# Patient Record
Sex: MALE | Race: WHITE
[De-identification: names, ages, dates, MRNs, and addresses within clinical notes are randomized per-mention and may not be internally consistent; named-entity substitution may affect disease eponyms.]

---

## 2017-01-02 ENCOUNTER — HOSPITAL ENCOUNTER (EMERGENCY)
Dept: HOSPITAL 10 - E/R | Age: 32
Discharge: HOME | End: 2017-01-02
Payer: COMMERCIAL

## 2017-01-02 VITALS — BODY MASS INDEX: 53.67 KG/M2 | HEIGHT: 60 IN | WEIGHT: 273.37 LBS

## 2017-01-02 VITALS
RESPIRATION RATE: 19 BRPM | HEART RATE: 88 BPM | DIASTOLIC BLOOD PRESSURE: 78 MMHG | SYSTOLIC BLOOD PRESSURE: 123 MMHG | TEMPERATURE: 98.7 F

## 2017-01-02 DIAGNOSIS — S09.90XA: ICD-10-CM
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
## 2017-01-02 DIAGNOSIS — R55: ICD-10-CM

## 2017-01-02 DIAGNOSIS — F17.210: ICD-10-CM

## 2017-01-02 DIAGNOSIS — R50.9: Primary | ICD-10-CM
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
## 2017-01-02 LAB
ANION GAP SERPL CALC-SCNC: 17 MMOL/L (ref 8–16)
BASOPHILS # BLD AUTO: 0 10^3/UL (ref 0–0.1)
BASOPHILS NFR BLD: 0.5 % (ref 0–2)
BUN SERPL-MCNC: 11 MG/DL (ref 7–20)
CALCIUM SERPL-MCNC: 9 MG/DL (ref 8.4–10.2)
CHLORIDE SERPL-SCNC: 102 MMOL/L (ref 97–110)
CO2 SERPL-SCNC: 24 MMOL/L (ref 21–31)
CONDITION: 1
CREAT SERPL-MCNC: 0.87 MG/DL (ref 0.61–1.24)
D DIMER PPP FEU-MCNC: < 220 NG/ML (ref ?–460)
EOSINOPHIL # BLD: 0.3 10^3/UL (ref 0–0.5)
EOSINOPHIL NFR BLD: 4.4 % (ref 0–7)
ERYTHROCYTE [DISTWIDTH] IN BLOOD BY AUTOMATED COUNT: 13.2 % (ref 11.5–14.5)
GLUCOSE SERPL-MCNC: 111 MG/DL (ref 70–220)
HCT VFR BLD CALC: 49.6 % (ref 42–52)
HGB BLD-MCNC: 16.9 G/DL (ref 14–18)
LYMPHOCYTES # BLD AUTO: 0.9 10^3/UL (ref 0.8–2.9)
LYMPHOCYTES NFR BLD AUTO: 15.9 % (ref 15–51)
MCH RBC QN AUTO: 32 PG (ref 29–33)
MCHC RBC AUTO-ENTMCNC: 34.2 G/DL (ref 32–37)
MCV RBC AUTO: 93.6 FL (ref 82–101)
MONOCYTES # BLD: 0.9 10^3/UL (ref 0.3–0.9)
MONOCYTES NFR BLD: 14.6 % (ref 0–11)
NEUTROPHILS # BLD: 3.8 10^3/UL (ref 1.6–7.5)
NEUTROPHILS NFR BLD AUTO: 64.6 % (ref 39–77)
NRBC # BLD MANUAL: 0 10^3/UL (ref 0–0)
NRBC BLD QL: 0 /100WBC (ref 0–0)
PLATELET # BLD: 154 10^3/UL (ref 140–440)
PMV BLD AUTO: 9.1 FL (ref 7.4–10.4)
POTASSIUM SERPL-SCNC: 3.4 MMOL/L (ref 3.5–5.1)
RBC # BLD AUTO: 5.3 10^6/UL (ref 4.7–6.1)
SODIUM SERPL-SCNC: 140 MMOL/L (ref 135–144)
WBC # BLD AUTO: 5.9 10^3/UL (ref 4.8–10.8)
WBC # BLD: 5.9 10^3/UL (ref 4.8–10.8)

## 2017-01-02 PROCEDURE — 71010: CPT

## 2017-01-02 PROCEDURE — 85025 COMPLETE CBC W/AUTO DIFF WBC: CPT

## 2017-01-02 PROCEDURE — 82962 GLUCOSE BLOOD TEST: CPT

## 2017-01-02 PROCEDURE — 36415 COLL VENOUS BLD VENIPUNCTURE: CPT

## 2017-01-02 PROCEDURE — 70450 CT HEAD/BRAIN W/O DYE: CPT

## 2017-01-02 PROCEDURE — 93005 ELECTROCARDIOGRAM TRACING: CPT

## 2017-01-02 PROCEDURE — 85378 FIBRIN DEGRADE SEMIQUANT: CPT

## 2017-01-02 PROCEDURE — 96374 THER/PROPH/DIAG INJ IV PUSH: CPT

## 2017-01-02 PROCEDURE — 80048 BASIC METABOLIC PNL TOTAL CA: CPT

## 2017-01-02 NOTE — RADRPT
PROCEDURE:   XR Chest. 

 

CLINICAL INDICATION:   Syncope

 

TECHNIQUE:   AP view of the chest was obtained. 

 

COMPARISON:   08/05/2016 

 

FINDINGS:

The cardiomediastinal silhouette is within normal limits.  The lungs are clear.  No pleural effusion
 or pneumothorax is evident.  

 

 

IMPRESSION:

No evidence of active cardiopulmonary disease.

 

RPTAT: GG

_____________________________________________ 

.Julio C Soria MD, MD           Date    Time 

Electronically viewed and signed by .Julio C Soria MD, MD on 01/02/2017 10:57 

 

D:  01/02/2017 10:57  T:  01/02/2017 10:57

.O/

## 2017-01-02 NOTE — RADRPT
PROCEDURE:   CT Brain without. 

 

CLINICAL INDICATION:   Syncope

 

TECHNIQUE:   A CT of the brain was performed utilizing axial sections from the skull base through th
e vertex without contrast.  The scan was reviewed in soft tissue brain and high frequency resolution
 bone algorithm windows.  Images were reviewed on a high-resolution PACS workstation. The exam CTDI 
= 43.95 mGy, and the DLP = 720.23 mGy-cm. 

 

COMPARISON:   None available  

 

FINDINGS:

 

The ventricles are normal in size and midline in position.  There is no intracranial hemorrhage, mid
line shift, or mass effect.  No abnormal extra-axial fluid collections are identified.  There is a 1
.8 x 3.1 cm cyst within the left anterior middle cranial fossa. The gray-white differentiation is we
ll preserved.  The basal cisterns are patent.  The posterior fossa is unremarkable.

 

The visualized portions of the orbits are unremarkable.  There is mild mucosal thickening of the max
illary sinuses.  The paranasal sinuses and mastoid air cells are otherwise clear.  No calvarial frac
ture or abnormality are identified.  The soft tissues are unremarkable.

 

IMPRESSION:

 

1.  No acute intracranial abnormality identified.

2.  1.8 x 3.1 cm arachnoid cyst within the left middle cranial fossa.

3.  Mild mucosal thickening of the maxillary sinuses.

 

 

RPTAT: HH

_____________________________________________ 

.Kelly Ramirez MD, MD           Date    Time 

Electronically viewed and signed by .Kelly Ramirez MD, MD on 01/02/2017 11:06 

 

D:  01/02/2017 11:06  T:  01/02/2017 11:06

.G/

## 2017-01-02 NOTE — ERD
ER Documentation


Chief Complaint


Date/Time


DATE: 1/2/17 


TIME: 11:46


Chief Complaint


BODYACHES, FEVER, HEADACHE, COUGH, ONSET 3 DAYS





HPI


31-year-old male no significant past medical history who presents to the 

emergency room with flulike symptoms.  Unfortunately, the patient had a 

syncopal episode in triage and hit his head.  The patient describes several 

days of flulike symptoms that include fever, myalgias, runny nose, cough and 

congestion.  He states the cough is dry and nonproductive.  He denies any rash, 

headache or neck stiffness.  While the patient was checking and he started to 

feel lightheaded his vision closed in any fall to the ground hitting his head.  

He had no witnessed seizure activity, Accu-Chek was normal.  The patient is now 

describing mild dull achy throbbing headache.





ROS


All systems reviewed and are negative except as per history of present illness.





Medications


Home Meds


Active Scripts


Ibuprofen* (Motrin*) 800 Mg Tab, 800 MG PO Q6H Y for PAIN AND OR ELEVATED TEMP, 

#30 TAB


   Prov:CECILIA BOWENS MD         1/2/17


Discontinued Scripts


Methylprednisolone* (Medrol* DOSE PACK) 4 Mg/Dose-Pack Tab.ds.pk, 4 MG PO .AS 

DIRECTED, #1 PACKET


   Prov:FÁTIMA ROJAS PA-C         10/28/16


Naproxen* (Naprosyn*) 500 Mg Tablet, 500 MG PO BID Y for PAIN AND/OR 

INFLAMMATION, #30 TAB


   Prov:FÁTIMA ROJAS PA-C         10/28/16


Naproxen* (Naprosyn*) 500 Mg Tablet, 500 MG PO BID Y for PAIN AND/OR 

INFLAMMATION, #15 TAB


   Prov:ELSIE FAITH DO         8/5/16





Allergies


Allergies:  


Coded Allergies:  


     No Known Allergy (Unverified , 3/22/13)





PMhx/Soc


History of Surgery:  Yes (APPendectomy)


Anesthesia Reaction:  No


Hx Neurological Disorder:  No


Hx Respiratory Disorders:  No


Hx Cardiac Disorders:  No


Hx Psychiatric Problems:  No


Hx Miscellaneous Medical Probl:  No


Hx Alcohol Use:  Yes (last wednesday)


Hx Substance Use:  No


Hx Tobacco Use:  Yes (1 pack every 3 weeks)


Smoking Status:  Current every day smoker





FmHx


Family History:  No diabetes





Physical Exam


Vitals





Vital Signs








  Date Time  Temp Pulse Resp B/P Pulse Ox O2 Delivery O2 Flow Rate FiO2


 


1/2/17 11:50  95 15 132/85 97 Nasal Cannula 3.0 


 


1/2/17 09:51 101.4 102 18 162/93 98   








Physical Exam


Airway is intact


Bilateral breath sounds


Strong distal pulses


No obvious deficits





General: Well developed, well nourished, no acute distress, feels warm to touch


Head: Normocephalic, atraumatic


Eyes: Pupils equally reactive, EOM intact


ENT: Moist mucous membranes


Neck: Supple, no lymphadenopathy, No midline tenderness, deformities, step-offs 

to the cervical spine, full active and passive range of motion without midline 

pain.


Respiratory: Lungs clear bilaterally, no distress, no chest wall tenderness, no 

crepitus


Cardiovascular: RRR, no murmurs, rubs, or gallops


Abdominal: Soft, non-tender, non-distended, no peritoneal signs, pelvis is 

stable


: Deferred


MSK: No edema, no unilateral swelling, 5/5 strength, no midline tenderness 

deformities or step-offs to the thoracolumbar spine


Neurologic: Alert and oriented, moving all extremities, normal speech, no focal 

weakness, no cerebellar signs, no meningismus


Skin: No ecchymoses or bruising to the chest or abdomen


Psych: Normal mood


Result Diagram:  


1/2/17 1111                                                                    

            1/2/17 1111





Results 24 hrs





 Laboratory Tests








Test


  1/2/17


11:11 1/2/17


11:28


 


Anion Gap 17  


 


Basophils # 0.010^3/ul  


 


Basophils % 0.5%  


 


Blood Urea Nitrogen 11mg/dl  


 


Calcium Level 9.0mg/dl  


 


Carbon Dioxide Level 24mmol/L  


 


Chloride Level 102mmol/L  


 


Creatinine 0.87mg/dl  


 


Eosinophils # 0.310^3/ul  


 


Eosinophils % 4.4%  


 


Glucose Level 111mg/dl  


 


Hematocrit 49.6%  


 


Hemoglobin 16.9g/dl  


 


Lymphocytes # 0.910^3/ul  


 


Lymphocytes % 15.9%  


 


Mean Corpuscular Hemoglobin 32.0pg  


 


Mean Corpuscular Hemoglobin


Concent 34.2g/dl 


  


 


 


Mean Corpuscular Volume 93.6fl  


 


Mean Platelet Volume 9.1fl  


 


Monocytes # 0.910^3/ul  


 


Monocytes % 14.6%  


 


Neutrophils # 3.810^3/ul  


 


Neutrophils % 64.6%  


 


Nucleated Red Blood Cells # 0.010^3/ul  


 


Nucleated Red Blood Cells % 0.0/100WBC  


 


Platelet Count 49133^3/UL  


 


Potassium Level 3.4mmol/L  


 


Red Blood Count 5.3010^6/ul  


 


Red Cell Distribution Width 13.2%  


 


Sodium Level 140mmol/L  


 


White Blood Count 5.910^3/ul  


 


Bedside Glucose  108mg/dL 








 Current Medications








 Medications


  (Trade)  Dose


 Ordered  Sig/Ramandeep


 Route


 PRN Reason  Start Time


 Stop Time Status Last Admin


Dose Admin


 


 Sodium Chloride


  (NS)  1,000 ml @ 


 1,000 mls/hr  Q1H STAT


 IV


   1/2/17 10:39


 1/2/17 11:38 DC 1/2/17 11:23


 


 


 Ketorolac


 Tromethamine


  (Toradol)  15 mg  ONCE  STAT


 IV


   1/2/17 11:33


 1/2/17 11:34 DC 1/2/17 11:42


 


 


 Acetaminophen


  (Tylenol Tab)  1,000 mg  ONCE  STAT


 PO


   1/2/17 11:33


 1/2/17 11:34 DC 1/2/17 11:42


 











Procedures/MDM


EKG, MONITORS, & DIAGNOSTIC IMAGING:


EKG: I reviewed and interpreted a 12-lead EKG. 


Rhythm: Normal sinus rhythm


Ectopy: None


Intervals: No abnormalities


ST segments: No elevations or depressions


T waves: No contiguous inversions








Chest x-ray: I reviewed and interpreted a 1 view of the chest


Mediastinum: No enlargement


Cardiac silhouette: No cardiomegaly


Airspace: Clear lung fields bilaterally without evidence of pneumothorax


Bones: No evidence of fracture








CT brain: No acute process








LAB INTERPRETATION:


No leukocytosis, no anemia





MEDICAL DECISION MAKING:


The patient presents with flulike symptoms.  The patient's clinical 

presentation is very consistent with an acute viral syndrome.





The patient does not exhibit any clinical signs or symptoms concerning for 

serious bacterial infection or systemic illness. Based on history and clinical 

exam findings the patient does not appear to have evidence of pneumonia, strep 

pharyngitis, urinary tract infection, bacteremia, sepsis, or meningitis.





The patient also had a syncopal episode in triage that is very consistent with 

vasovagal syncope.  He did hit his head, no occipital hematoma.  CT imaging is 

appropriate given head trauma.  The patient does not meet high-risk criteria 

and based on NEXUS cervical spine criteria there is no indication for cervical 

spine imaging at this time.





The patient's syncopal episode is not consistent with more serious etiology 

such as subarachnoid hemorrhage, dissection, meningitis.  His headache occurred 

after the head trauma and is not consistent with meningitis or encephalitis.





ER COURSE:


The patient was evaluated he was given IV fluids, antipyretics with improved 

symptomatology.  His laboratory testing and diagnostic imaging shows no 

evidence of endorgan dysfunction or serious etiology or injury secondary to 

fall.  The patient was advised of symptom control including oral hydration, 

antipyretics.  At this time no indication for antibiotics.  The patient does 

not meet criteria for Tamiflu.





I kept the patient and/or family informed of laboratory and diagnostic imaging 

results throughout the emergency room course.





DISPOSITION PLAN:


We discussed follow up with the patient's primary care doctor within 24 to 48 

hours as needed.  We also discussed return to the emergency room for worsening 

symptoms or worsening condition.





Discharge Medications:


Motrin





Departure


Diagnosis:  


 Primary Impression:  


 Influenza-like illness


 Additional Impressions:  


 Vasovagal syncope


 Closed head injury


 Encounter type:  initial encounter  Qualified Code:  S09.90XA - Closed head 

injury, initial encounter


Condition:  Stable











CECILIA BOWENS MD Jan 2, 2017 11:49

## 2017-01-14 ENCOUNTER — HOSPITAL ENCOUNTER (EMERGENCY)
Dept: HOSPITAL 10 - FTE | Age: 32
Discharge: HOME | End: 2017-01-14
Payer: COMMERCIAL

## 2017-01-14 VITALS
BODY MASS INDEX: 42.61 KG/M2 | HEIGHT: 70 IN | HEIGHT: 70 IN | WEIGHT: 297.62 LBS | WEIGHT: 297.62 LBS | BODY MASS INDEX: 42.61 KG/M2

## 2017-01-14 VITALS
TEMPERATURE: 98.7 F | RESPIRATION RATE: 18 BRPM | DIASTOLIC BLOOD PRESSURE: 78 MMHG | SYSTOLIC BLOOD PRESSURE: 128 MMHG | HEART RATE: 69 BPM

## 2017-01-14 DIAGNOSIS — R00.2: Primary | ICD-10-CM

## 2017-01-14 DIAGNOSIS — F17.210: ICD-10-CM

## 2017-01-14 PROCEDURE — 93005 ELECTROCARDIOGRAM TRACING: CPT

## 2017-01-14 PROCEDURE — 99283 EMERGENCY DEPT VISIT LOW MDM: CPT

## 2017-01-14 NOTE — ERD
ER Documentation


Chief Complaint


Date/Time


DATE: 1/14/17 


TIME: 23:04


Chief Complaint


c/p pain, palpitations prior to arrival





HPI


This is a 31-year-old male who presents to the emergency department today 

complaining of palpitations that started yesterday at work.  Patient states it 

stopped and then started back up again today at work.  He states that he felt 

like he got pale and felt like he had numbness and that there was fluid moving 

through his body.  Denies any cough, fevers or chills.





ROS


All systems reviewed and are negative except as per history of present illness.





Medications


Home Meds


Active Scripts


Ibuprofen* (Motrin*) 800 Mg Tab, 800 MG PO Q6H Y for PAIN AND OR ELEVATED TEMP, 

#30 TAB


   Prov:CECILIA BOWENS MD         1/2/17





Allergies


Allergies:  


Coded Allergies:  


     No Known Allergy (Unverified , 3/22/13)





PMhx/Soc


Medical and Surgical Hx:  pt denies Medical Hx


History of Surgery:  Yes (APPendectomy)


Anesthesia Reaction:  No


Hx Neurological Disorder:  No


Hx Respiratory Disorders:  No


Hx Cardiac Disorders:  No


Hx Psychiatric Problems:  No


Hx Miscellaneous Medical Probl:  No


Hx Alcohol Use:  Yes (last wednesday)


Hx Substance Use:  Yes (COCAINE 1 TIME)


Hx Tobacco Use:  Yes (1 pack every 3 weeks)


Smoking Status:  Current some day smoker





Physical Exam


Vitals





Vital Signs








  Date Time  Temp Pulse Resp B/P Pulse Ox O2 Delivery O2 Flow Rate FiO2


 


1/14/17 19:11 98.6 90 18 145/88 95   








Physical Exam


Const:     Talkative, no acute distress


Head:   Atraumatic 


Eyes:    Normal Conjunctiva


ENT:    Normal External Ears, Nose and Mouth.


Neck:               Full range of motion..~ No meningismus.


Resp:    Clear to auscultation bilaterally.  No absent breath sounds.  No 

wheezing.


Cardio:    Regular rate and rhythm, no murmurs


Abd:    Soft, non tender, non distended. Normal bowel sounds


Skin:    No petechiae or rashes


Back:    No midline or flank tenderness


Ext:    No cyanosis, or edema.  No leg pain.


Neur:    Awake and alert


Psych:    Normal Mood and Affect





Procedures/MDM


This is a 31-year-old male who presents to the emergency department today 

complaining of palpitations.





EKG read and interpreted by Dr. Rosas rate 82 bpm.  No ST elevation.  No QT 

prolongation.  Normal sinus rhythm with incomplete right bundle branch block.  

Patient had no syncopal episode.  Low suspicion for acute MI, PE, pericarditis.





I did offer to obtain a chest x-ray for the patient however he states he was 

here a couple of weeks ago for a fever and had a chest x-ray at that time that 

was negative.  Patient declined a secondary chest x-ray.  Patient denied 

feeling anxiety however his symptoms at this time appear most consistent with 

anxiety.  I do not feel the patient requires further imaging or laboratory 

workup.  Patient declined antianxiety meds here in the emergency department.  

He declined medication for home.  I did discuss with the patient that he needed 

to follow-up and establish with a primary care physician as he does have 

insurance through his school.  I have also given him a list of resources for 

cardiology as he appears to have these palpitations frequently.  I explained to 

the patient that he could follow-up with them for a Holter monitor.  This time 

patient is afebrile and otherwise well-appearing.  He is very talkative in the 

exam room.  Low suspicion for PE.  He has no cough.





At this time the patient is stable for discharge and outpatient management. 

Patient should follow up with their PCP in the next 1-2 days.  They may return 

to the emergency department sooner for any persistent or worsening of symptoms.

  Patient understood and agreed with the plan.





Departure


Diagnosis:  


 Primary Impression:  


 Palpitations


Condition:  Fair


Patient Instructions:  Your Body's Response to Anxiety, Palpitations


Referrals:  


DC DÍAZ,ASIA CAIN MD,RICKY COHEN,REINIER FUNEZ,ERINN WARD,VANIA FARIAS,RAMSEY HEBERT,CHRISTINE CARRILLO,DEREK TOLEDO,EDWIN VAUGHAN,CHINA PARMAR,MAIRA FRITZ MD








[facility]


YOU HAVE RECEIVED A MEDICAL SCREENING EXAM AND THE RESULTS INDICATE THAT YOU DO 

NOT HAVE A CONDITION THAT REQUIRES URGENT TREATMENT IN THE EMERGENCY DEPARTMENT.





FURTHER EVALUATION AND TREATMENT OF YOUR CONDITION CAN WAIT UNTIL YOU ARE SEEN 

IN YOUR DOCTORS OFFICE WITHIN THE NEXT 1-2 DAYS. IT IS YOUR RESPONSIBILITY TO 

MAKE AN APPOINTMENT FOR FOLOW-UP CARE.





IF YOU HAVE A PRIMARY DOCTOR


--you should call your primary doctor and schedule an appointment





IF YOU DO NOT HAVE A PRIMARY DOCTOR YOU CAN CALL OUR PHYSICIAN REFERRAL HOTLINE 

AT


 [phone] 





IF YOU CAN NOT AFFORD TO SEE A PHYSICIAN YOU CAN CHOSE FROM THE FOLLOWING 

[facility]





[facility] [phone] [address] [facility] [phone] [address] [facility] [place] [phone] [facility] [phone] [address] [facility] [phone] [address] [facility]. [facility]. [phone] [address]





Additional Instructions:  


Call your primary care doctor TOMORROW for an appointment during the next 1-2 

days.See the doctor sooner or return here if your condition worsens before your 

appointment time.





Make an appointment with a primary care physician for referral to cardiology.











DEVYN NASCIMENTO PA-C Jan 14, 2017 23:08

## 2017-05-27 ENCOUNTER — HOSPITAL ENCOUNTER (EMERGENCY)
Dept: HOSPITAL 10 - FTE | Age: 32
Discharge: HOME | End: 2017-05-27
Payer: COMMERCIAL

## 2017-05-27 VITALS
WEIGHT: 273.37 LBS | BODY MASS INDEX: 39.14 KG/M2 | WEIGHT: 273.37 LBS | BODY MASS INDEX: 39.14 KG/M2 | HEIGHT: 70 IN | HEIGHT: 70 IN

## 2017-05-27 DIAGNOSIS — M54.40: Primary | ICD-10-CM

## 2017-05-27 DIAGNOSIS — F17.210: ICD-10-CM

## 2017-05-27 DIAGNOSIS — M79.672: ICD-10-CM

## 2017-05-27 PROCEDURE — 73630 X-RAY EXAM OF FOOT: CPT

## 2017-05-27 PROCEDURE — 72100 X-RAY EXAM L-S SPINE 2/3 VWS: CPT

## 2017-05-27 NOTE — RADRPT
PROCEDURE:   LUMBAR SPINE - 3 VIEWS 

 

CLINICAL INDICATION:   31-year-old male with back pain. 

 

TECHNIQUE:   AP, lateral and cone-down lateral view of the lumbar spine were obtained. The images we
re reviewed on a PACS workstation. 

 

COMPARISON:   None. 

 

FINDINGS:

 

The lumbar vertebral bodies have normal heights.  There is evidence for L4 pars interarticularis def
ects.  There is anterolisthesis of L4 on L5 of approximately 30%.  There is moderate L4-5 disk space
 narrowing.  The partially visualized sacrum appears intact.  The sacroiliac joints are unremarkable
.

 

IMPRESSION:

 

L4-5 spondylolisthesis (30%) with discogenic disease.

_____________________________________________ 

.Deshawn Iraheta MD, MD           Date    Time 

Electronically viewed and signed by .Deshawn Iraheta MD, MD on 05/27/2017 06:01 

 

D:  05/27/2017 06:01  T:  05/27/2017 06:01

.NING

## 2017-05-27 NOTE — RADRPT
PROCEDURE:    LEFT FOOT - 3 VIEWS 

 

CLINICAL INDICATION:   31-year-old male with left foot pain. 

 

TECHNIQUE:   AP, lateral and oblique views of the left foot was obtained.  The images were reviewed 
on a PACS workstation. 

 

COMPARISON:   None. 

 

FINDINGS:

 

The bones of the left foot appear intact, with no evidence of fracture, dislocation, or subluxation.
 The joint spaces are preserved. Bone mineralization is within normal limits.  No radiopaque foreign
 body is seen.

 

IMPRESSION:

 

Unremarkable left foot radiographs.

_____________________________________________ 

.Deshawn Iraheta MD, MD           Date    Time 

Electronically viewed and signed by .Deshawn Iraheta MD, MD on 05/27/2017 06:03 

 

D:  05/27/2017 06:03  T:  05/27/2017 06:03

.M/

## 2017-05-27 NOTE — ERA
ER Documentation


Chief Complaint


Date/Time


DATE: 5/27/17 


TIME: 04:19


Chief Complaint


low back pain running down to left leg  since yesterday





HPI


This is a 31-year-old male who presents with left foot pain that radiates 

upwards to his back for the past 2 months.  Patient states that walking and 

standing and pressure of the affected area on the bottom of the left foot makes 

it worse.  Patient denies any relieving factors except for rest.  Patient has 

not taken any medication to relieve the symptoms.  Patient has been drinking 2 

hours ago.  Patient denies pain lasting more than 6 weeks; Denies saddle 

parasthesia, incontinence, RPND, or pain exacerbated by valsalva; Denies fever, 

chills, night sweats, weight loss, or increase symptoms at night. Patient also 

denies h/o diabetes, cardiovascular dz, sciatica, disk herniation, spinal 

stenosis, fibromyalgia, cancer, HIV, IVDU, arthritis or recent surgery.





ROS


All systems reviewed and are negative except as per history of present illness.





Medications


Home Meds


Active Scripts


Ibuprofen* (Motrin*) 800 Mg Tab, 800 MG PO Q6H Y for PAIN AND OR ELEVATED TEMP, 

#30 TAB


   Prov:CECILIA BOWENS MD         1/2/17





Allergies


Allergies:  


Coded Allergies:  


     No Known Allergy (Unverified , 3/22/13)





PMhx/Soc


History of Surgery:  Yes (APPendectomy)


Anesthesia Reaction:  No


Hx Neurological Disorder:  No


Hx Respiratory Disorders:  No


Hx Cardiac Disorders:  No


Hx Psychiatric Problems:  No


Hx Miscellaneous Medical Probl:  No


Hx Alcohol Use:  Yes (last wednesday)


Hx Substance Use:  Yes (COCAINE 1 TIME)


Hx Tobacco Use:  Yes (1 pack every 3 weeks)


Smoking Status:  Current every day smoker





Physical Exam


Vitals





Vital Signs








  Date Time  Temp Pulse Resp B/P Pulse Ox O2 Delivery O2 Flow Rate FiO2


 


5/27/17 03:59 97.8 120 20 143/90 100   








Physical Exam


Const: Morbidly obese 31-year-old male


Head:   Atraumatic 


Eyes:    Normal Conjunctiva


ENT:    Normal External Ears, Nose and Mouth.


Neck:               Full range of motion..~ No meningismus.


Resp:    Clear to auscultation bilaterally


Cardio:    Regular rate and rhythm, no murmurs


Abd:    Soft, non tender, non distended. Normal bowel sounds


Skin:    No petechiae or rashes


Back:    No midline or flank tenderness.  Negative straight leg raise.  No CVA 

tenderness.


Ext:    No cyanosis, or edema.  Normal movement of all extremities with gross 

examination.


Neur:    Awake and alert.  Neurovascularly intact bilaterally.


Psych:    Normal Mood and Affect


Results 24 hrs








 Current Medications








 Medications


  (Trade)  Dose


 Ordered  Sig/Ramandeep


 Route


 PRN Reason  Start Time


 Stop Time Status Last Admin


Dose Admin


 


 Ibuprofen


  (Motrin)  600 mg  ONCE  ONCE


 PO


   5/27/17 04:30


 5/27/17 04:31 DC 5/27/17 05:04


 














Procedures/MDM


Patient was evaluated and worked up for left foot and lower back pain.





Patient was given ibuprofen with moderate relief of symptoms.  Workup included x

-rays of the lumbar spine and of the left foot.  Which revealed the following:L4

-5 spondylolisthesis (30%) with discogenic disease. The current most likely 

diagnosis includes, but is not limited to the following:


Pain from radiographic findings of spondylolithiasis with discogenic disease, 

sciatica, meralgia paresthesia, Hernandez's neuroma, tarsal tunnel syndrome and 

other compressive peripheral neuropathies.  Thus, the treatment plan will 

include an NSAID for discomfort as well as conservative therapy which has been 

discussed with the patient. At this time I do not suspect cauda equina syndrome

, spinal cord compression, aortic aneurysm, aortic dissection, epidural abscess

, spinal hematoma, malignancy, Charcot's foot, kidney stones or pyelonephritis. 

I have spoke with the patient regarding their condition and future management. 

They have verbally responded that they understand their status and treatment 

plan. The patients vitals are stable, and their current condition is 

appropriate for discharge. The patient will be given discharge instructions 

with return precautions.





Departure


Diagnosis:  


 Primary Impression:  


 Back pain


 Qualified Code:  M54.5 - Chronic left-sided low back pain, with sciatica 

presence unspecified


 Additional Impression:  


 Foot pain, left


Condition:  Stable





Additional Instructions:  


Follow up with your PCP within the next 1-3 days for a more thorough evaluation 

and a possible referral to a specialist. Return the the emergency department 

immediately if symptoms worsen or change. If you have any questions regarding 

medications, ask your pharmacist or us before you leave. If any adverse 

reactions occur while taking your medications, discontinue the treatment and 

return to the emergency department immediately.  Take your medications as 

directed, and complete the entire course of treatment.











CHARBEL CARBALLO PA-C May 27, 2017 04:24

## 2017-07-05 ENCOUNTER — HOSPITAL ENCOUNTER (EMERGENCY)
Dept: HOSPITAL 10 - E/R | Age: 32
LOS: 1 days | Discharge: HOME | End: 2017-07-06
Payer: COMMERCIAL

## 2017-07-05 VITALS
BODY MASS INDEX: 38.51 KG/M2 | WEIGHT: 268.96 LBS | BODY MASS INDEX: 38.51 KG/M2 | HEIGHT: 70 IN | HEIGHT: 70 IN | WEIGHT: 268.96 LBS

## 2017-07-05 DIAGNOSIS — R50.9: ICD-10-CM

## 2017-07-05 DIAGNOSIS — Z87.891: ICD-10-CM

## 2017-07-05 DIAGNOSIS — R11.10: ICD-10-CM

## 2017-07-05 DIAGNOSIS — R19.7: ICD-10-CM

## 2017-07-05 DIAGNOSIS — R10.13: ICD-10-CM

## 2017-07-05 DIAGNOSIS — R00.2: Primary | ICD-10-CM

## 2017-07-05 LAB
ABNORMAL IP MESSAGE: 1
ADD SCAN DIFF: NO
ADD UMIC: NO
ALBUMIN SERPL-MCNC: 5 G/DL (ref 3.3–4.9)
ALBUMIN/GLOB SERPL: 1.47 {RATIO}
ALP SERPL-CCNC: 73 IU/L (ref 42–121)
ALT SERPL-CCNC: 73 IU/L (ref 13–69)
ANION GAP SERPL CALC-SCNC: 16 MMOL/L (ref 8–16)
APTT BLD: 27.1 SEC (ref 25–35)
AST SERPL-CCNC: 36 IU/L (ref 15–46)
BASOPHILS # BLD AUTO: 0 10^3/UL (ref 0–0.1)
BASOPHILS NFR BLD: 0.5 % (ref 0–2)
BILIRUB DIRECT SERPL-MCNC: 0 MG/DL (ref 0–0.2)
BILIRUB SERPL-MCNC: 0.5 MG/DL (ref 0.2–1.3)
BUN SERPL-MCNC: 10 MG/DL (ref 7–20)
CALCIUM SERPL-MCNC: 9.6 MG/DL (ref 8.4–10.2)
CHLORIDE SERPL-SCNC: 98 MMOL/L (ref 97–110)
CO2 SERPL-SCNC: 25 MMOL/L (ref 21–31)
COLOR UR: YELLOW
CREAT SERPL-MCNC: 0.74 MG/DL (ref 0.61–1.24)
D DIMER PPP FEU-MCNC: 897.37 NG/ML (ref ?–460)
EOSINOPHIL # BLD: 0 10^3/UL (ref 0–0.5)
EOSINOPHIL NFR BLD: 0.3 % (ref 0–7)
ERYTHROCYTE [DISTWIDTH] IN BLOOD BY AUTOMATED COUNT: 12.8 % (ref 11.5–14.5)
GLOBULIN SER-MCNC: 3.4 G/DL (ref 1.3–3.2)
GLUCOSE SERPL-MCNC: 105 MG/DL (ref 70–220)
GLUCOSE UR STRIP-MCNC: NEGATIVE MG/DL
HCT VFR BLD CALC: 48.2 % (ref 42–52)
HGB BLD-MCNC: 17.1 G/DL (ref 14–18)
INR PPP: 0.92
KETONES UR STRIP.AUTO-MCNC: NEGATIVE MG/DL
LYMPHOCYTES # BLD AUTO: 0.5 10^3/UL (ref 0.8–2.9)
LYMPHOCYTES NFR BLD AUTO: 6.5 % (ref 15–51)
MCH RBC QN AUTO: 32.4 PG (ref 29–33)
MCHC RBC AUTO-ENTMCNC: 35.5 G/DL (ref 32–37)
MCV RBC AUTO: 91.5 FL (ref 82–101)
MONOCYTES # BLD: 0.4 10^3/UL (ref 0.3–0.9)
MONOCYTES NFR BLD: 5.8 % (ref 0–11)
MUCOUS THREADS #/AREA URNS HPF: (no result) /HPF
NEUTROPHILS # BLD: 6.4 10^3/UL (ref 1.6–7.5)
NEUTROPHILS NFR BLD AUTO: 86.8 % (ref 39–77)
NITRITE UR QL STRIP.AUTO: NEGATIVE MG/DL
NRBC # BLD MANUAL: 0 10^3/UL (ref 0–0)
NRBC BLD QL: 0 /100WBC (ref 0–0)
PLATELET # BLD: 176 10^3/UL (ref 140–415)
PMV BLD AUTO: 11.2 FL (ref 7.4–10.4)
POTASSIUM SERPL-SCNC: 3.7 MMOL/L (ref 3.5–5.1)
PROT SERPL-MCNC: 8.4 G/DL (ref 6.1–8.1)
PROTHROMBIN TIME: 12.4 SEC (ref 12.2–14.2)
PT RATIO: 1
RBC # BLD AUTO: 5.27 10^6/UL (ref 4.7–6.1)
RBC # UR AUTO: NEGATIVE MG/DL
SODIUM SERPL-SCNC: 135 MMOL/L (ref 135–144)
TROPONIN I SERPL-MCNC: < 0.012 NG/ML (ref 0–0.12)
UR ASCORBIC ACID: NEGATIVE MG/DL
UR BILIRUBIN (DIP): NEGATIVE MG/DL
UR CLARITY: (no result)
UR PH (DIP): 5 (ref 5–9)
UR RBC: 2 /HPF (ref 0–5)
UR SPECIFIC GRAVITY (DIP): 1.02 (ref 1–1.03)
UR TOTAL PROTEIN (DIP): NEGATIVE MG/DL
UROBILINOGEN UR STRIP-ACNC: NEGATIVE MG/DL
WBC # BLD AUTO: 7.4 10^3/UL (ref 4.8–10.8)
WBC # UR STRIP: NEGATIVE LEU/UL

## 2017-07-05 PROCEDURE — 85730 THROMBOPLASTIN TIME PARTIAL: CPT

## 2017-07-05 PROCEDURE — 85378 FIBRIN DEGRADE SEMIQUANT: CPT

## 2017-07-05 PROCEDURE — 81001 URINALYSIS AUTO W/SCOPE: CPT

## 2017-07-05 PROCEDURE — 81003 URINALYSIS AUTO W/O SCOPE: CPT

## 2017-07-05 PROCEDURE — 87086 URINE CULTURE/COLONY COUNT: CPT

## 2017-07-05 PROCEDURE — 71010: CPT

## 2017-07-05 PROCEDURE — 83605 ASSAY OF LACTIC ACID: CPT

## 2017-07-05 PROCEDURE — 93005 ELECTROCARDIOGRAM TRACING: CPT

## 2017-07-05 PROCEDURE — 85025 COMPLETE CBC W/AUTO DIFF WBC: CPT

## 2017-07-05 PROCEDURE — 84484 ASSAY OF TROPONIN QUANT: CPT

## 2017-07-05 PROCEDURE — 85610 PROTHROMBIN TIME: CPT

## 2017-07-05 PROCEDURE — 83690 ASSAY OF LIPASE: CPT

## 2017-07-05 PROCEDURE — 71275 CT ANGIOGRAPHY CHEST: CPT

## 2017-07-05 PROCEDURE — 36415 COLL VENOUS BLD VENIPUNCTURE: CPT

## 2017-07-05 PROCEDURE — 80053 COMPREHEN METABOLIC PANEL: CPT

## 2017-07-05 NOTE — RADRPT
PROCEDURE:   XR Chest. 

 

CLINICAL INDICATION:   Dyspnea and sepsis 

 

TECHNIQUE:   AP Portable chest. 

 

COMPARISON:   01/02/2017 chest x-ray

 

FINDINGS:

 

The soft tissues and bones are remarkable for multiple EKG leads superimposed over the chest wall.. 
 No focal infiltrates, masses, or effusions are noted.  The mediastinum and heart are normal.  No pn
eumothorax is present. 

 

IMPRESSION:

 

1.  No radiographic evidence for acute cardiopulmonary disease

 

 

 

RPTAT: HDC

_____________________________________________ 

.Reanna Franco MD, MD           Date    Time 

Electronically viewed and signed by .Reanna Franco MD, MD on 07/05/2017 23:45 

 

D:  07/05/2017 23:45  T:  07/05/2017 23:45

.C/

## 2017-07-06 VITALS
TEMPERATURE: 99.2 F | DIASTOLIC BLOOD PRESSURE: 89 MMHG | RESPIRATION RATE: 20 BRPM | HEART RATE: 92 BPM | SYSTOLIC BLOOD PRESSURE: 142 MMHG

## 2017-07-06 NOTE — RADRPT
PROCEDURE:   CTA Chest. 

 

CLINICAL INDICATION:   Chest pain, rule out pulmonary embolism. 

 

TECHNIQUE:   Direct spiral axial sections were obtained from the thoracic inlet to the upper abdomen
 with the use of 100 cc of Omnipaque 350 nonionic intravenous contrast material. Axial MIP, coronal,
 and sagittal reformations were obtained. The images were reviewed on a PACS workstation. CTDIvol: 2
0.04 mGy. DLP: 798.38 mGy-cm.

 

One or more of the following dose reduction techniques were used:  

- Automated exposure control.

- Adjustment of the mA and/or kV according to patient size. 

- Use of iterative reconstruction technique.

 

COMPARISON:   None. 

 

FINDINGS:

 

No pulmonary embolism is identified, however evaluation for subsegmental emboli in the lingula is li
mited by cardiac motion. The main pulmonary artery is normal in caliber.  There is no aortic aneurys
m. The heart is not enlarged.  There is no pericardial effusion.

 

There are minimal atelectatic changes in the lungs. There is no pulmonary edema or consolidation.  N
o pleural effusion or pneumothorax is identified.

 

No suspicious thyroid lesion is seen.  There is no thoracic lymphadenopathy. The trachea and mainste
m bronchi are patent. 

 

Limited evaluation of the upper abdomen is unremarkable.

 

There is no suspicious osseous lesion.

 

IMPRESSION:

 

1.  No pulmonary embolism is identified, however evaluation for subsegmental emboli in the lingula i
s limited by cardiac motion. 

2.  No pulmonary edema or consolidation.

 

 

 

 

RPTAT: HTAR

_____________________________________________ 

.Miguel Claire MD, MD           Date    Time 

Electronically viewed and signed by .Miguel Claire MD, MD on 07/06/2017 02:04 

 

D:  07/06/2017 02:04  T:  07/06/2017 02:04

.R/

## 2017-07-06 NOTE — ERD
ER Documentation


Chief Complaint


Date/Time


DATE: 7/6/17 


TIME: 03:58


Chief Complaint


palpitation and dizzy 2 hours PTA. Denies CP at this time





HPI


31-year-old male with no previous medical history presenting with palpitations 

for the past few hours.  He denies any associated chest pain or significant 

shortness of breath.  He has not noticed any fevers, chills, coughing, URI 

symptoms.  He does endorse dizziness and vomiting several times today with 

nonbloody loose stools.  He had mild epigastric pain earlier but now that has 

resolved.  He denies any recent travel, immobilization, leg swelling or pain.  

No history of blood clots.





ROS


All systems reviewed and are negative except as per history of present illness.





Medications


Home Meds


Active Scripts


Ondansetron (Ondansetron Odt) 4 Mg Tab.rapdis, 4 MG PO Q6H Y for NAUSEA AND/OR 

VOMITING, #10 TAB


   Prov:SHEREE MOURA MD         7/6/17


Discontinued Scripts


Ibuprofen* (Motrin*) 600 Mg Tab, 600 MG PO Q6, #15 TAB


   Prov:MEENA ALMEIDA NP         5/27/17


Ibuprofen* (Motrin*) 800 Mg Tab, 800 MG PO Q6H Y for PAIN AND OR ELEVATED TEMP, 

#30 TAB


   Prov:CECILIA BOWENS MD         1/2/17





Allergies


Allergies:  


Coded Allergies:  


     No Known Allergy (Unverified , 7/5/17)





PMhx/Soc


History of Surgery:  Yes (APPendectomy)


Anesthesia Reaction:  No


Hx Neurological Disorder:  No


Hx Respiratory Disorders:  No


Hx Cardiac Disorders:  No


Hx Psychiatric Problems:  No


Hx Miscellaneous Medical Probl:  No


Hx Alcohol Use:  Yes (occasional)


Hx Substance Use:  Yes (COCAINE 1 TIME)


Hx Tobacco Use:  No (1 pack every 3 weeks)


Smoking Status:  Former smoker





FmHx


Family History:  No diabetes





Physical Exam


Vitals





Vital Signs








  Date Time  Temp Pulse Resp B/P Pulse Ox O2 Delivery O2 Flow Rate FiO2


 


7/6/17 02:22 99.2 92 20 142/89 100 Room Air  


 


7/5/17 23:03      Nasal Cannula  


 


7/5/17 19:06 101.2 132 18 132/83 96   








Physical Exam


Const:     Nontoxic, no distress, overweight


Head:   Atraumatic 


Eyes:    Normal Conjunctiva


ENT:    Normal External Ears, Nose and Mouth.  Posterior oropharynx normal


Neck:               Full range of motion.  No JVD.  No meningismus.


Resp:   No tachypnea, clear to auscultation bilaterally


Cardio:   Tachycardic with regular rhythm, no murmurs


Abd:    Soft, non tender, non distended. Normal bowel sounds


Skin:    No petechiae or rashes


Back:    No midline or flank tenderness


Ext:    No cyanosis, or edema


Neur:    Awake and alert and oriented 3, cranial nerves intact, strength and 

sensations intact in all 4 extremities, normal gait


Psych:    Normal Mood and Affect


Result Diagram:  


7/5/17 2250 7/5/17 2250





Results 24 hrs





 Laboratory Tests








Test


  7/5/17


22:50 7/6/17


00:25


 


White Blood Count 7.410^3/ul  


 


Red Blood Count 5.2710^6/ul  


 


Hemoglobin 17.1g/dl  


 


Hematocrit 48.2%  


 


Mean Corpuscular Volume 91.5fl  


 


Mean Corpuscular Hemoglobin 32.4pg  


 


Mean Corpuscular Hemoglobin


Concent 35.5g/dl 


  


 


 


Red Cell Distribution Width 12.8%  


 


Platelet Count 96893^3/UL  


 


Mean Platelet Volume 11.2fl  


 


Neutrophils % 86.8%  


 


Lymphocytes % 6.5%  


 


Monocytes % 5.8%  


 


Eosinophils % 0.3%  


 


Basophils % 0.5%  


 


Nucleated Red Blood Cells % 0.0/100WBC  


 


Neutrophils # 6.410^3/ul  


 


Lymphocytes # 0.510^3/ul  


 


Monocytes # 0.410^3/ul  


 


Eosinophils # 0.010^3/ul  


 


Basophils # 0.010^3/ul  


 


Nucleated Red Blood Cells # 0.010^3/ul  


 


Prothrombin Time 12.4Sec  


 


Prothrombin Time Ratio 1.0  


 


INR International Normalized


Ratio 0.92 


  


 


 


Activated Partial


Thromboplast Time 27.1Sec 


  


 


 


D-Dimer 897.37ng/ml  


 


D-Dimer Comment   


 


Urine Color YELLOW  


 


Urine Clarity


  SLIGHTLY


CLOUDY 


 


 


Urine pH 5.0  


 


Urine Specific Gravity 1.025  


 


Urine Ketones NEGATIVEmg/dL  


 


Urine Nitrite NEGATIVEmg/dL  


 


Urine Bilirubin NEGATIVEmg/dL  


 


Urine Urobilinogen NEGATIVEmg/dL  


 


Urine Leukocyte Esterase NEGATIVELeu/ul  


 


Urine Microscopic RBC 2/HPF  


 


Urine Microscopic WBC 1/HPF  


 


Urine Mucus MANY/HPF  


 


Urine Hemoglobin NEGATIVEmg/dL  


 


Urine Glucose NEGATIVEmg/dL  


 


Urine Total Protein NEGATIVEmg/dl  


 


Sodium Level 135mmol/L  


 


Potassium Level 3.7mmol/L  


 


Chloride Level 98mmol/L  


 


Carbon Dioxide Level 25mmol/L  


 


Anion Gap 16  


 


Blood Urea Nitrogen 10mg/dl  


 


Creatinine 0.74mg/dl  


 


Glucose Level 105mg/dl  


 


Lactic Acid Level 1.4mmol/L  1.3mmol/L 


 


Calcium Level 9.6mg/dl  


 


Total Bilirubin 0.5mg/dl  


 


Direct Bilirubin 0.00mg/dl  


 


Indirect Bilirubin 0.5mg/dl  


 


Aspartate Amino Transf


(AST/SGOT) 36IU/L 


  


 


 


Alanine Aminotransferase


(ALT/SGPT) 73IU/L 


  


 


 


Alkaline Phosphatase 73IU/L  


 


Troponin I < 0.012ng/ml  


 


Total Protein 8.4g/dl  


 


Albumin 5.0g/dl  


 


Globulin 3.40g/dl  


 


Albumin/Globulin Ratio 1.47  


 


Lipase 42U/L  








 Current Medications








 Medications


  (Trade)  Dose


 Ordered  Sig/Ramandeep


 Route


 PRN Reason  Start Time


 Stop Time Status Last Admin


Dose Admin


 


 Sodium Chloride


  (NS)  3,780 ml  BOLUS OVER 2 HOURS STAT


 IV*


   7/5/17 22:34


 7/5/17 22:37 DC 7/5/17 22:58


 


 


 Acetaminophen


  (Tylenol Tab)  650 mg  ONCE  STAT


 PO


   7/5/17 22:34


 7/5/17 22:37 DC 7/5/17 23:02


 


 


 IV Flush 10 ml  10 ml  STK-MED ONCE


 .ROUTE


   7/6/17 00:40


 7/6/17 00:41 DC 7/6/17 01:20


 


 


 Sodium Chloride  100 ml @ ud  STK-MED ONCE


 .ROUTE


   7/6/17 00:40


 7/6/17 00:41 DC 7/6/17 01:20


 


 


 Iohexol


  (Omnipaque)  100 ml @ ud  STK-MED ONCE


 .ROUTE


   7/6/17 00:40


 7/6/17 00:41 DC 7/6/17 01:20


 











Procedures/MDM


EKG: 


Rate/Rhythm:             Sinus tachycardia at 137 bpm


QRS, ST, T-waves:    Left anterior fascicular block, no changes consistent w/ 

acute ischemia


Impression:      No evidence of ischemia or arrhythmia





Labs


CBC: no anemia or evidence of infection


CMP: No evidence of electrolyte abnormality, renal failure, hypoglycemia, liver 

failure, or biliary obstruction


D-dimer elevated


Troponin within normal limits


Lactate within normal limits


UA: no evidence of infection





Chest x-ray shows no acute abnormalities





CTPA:


IMPRESSION:


 


1.  No pulmonary embolism is identified, however evaluation for subsegmental 

emboli in the lingula is limited by cardiac motion. 


2.  No pulmonary edema or consolidation.


 


_____________________________________________ 


.Miguel Claire MD, MD           Date    Time 


Electronically viewed and signed by .Miguel Claire MD, MD on 07/06/2017 02:04 





[facility]





Patient is presenting with palpitations and fever.  Sepsis workup was 

initiated.  D-dimer was sent given patient's tachycardia, making him PERC 

positive.  Chest x-ray did not show pneumonia.  Abdominal exam is not 

concerning for acute surgical abdomen.  There is no evidence of UTI.  His d-

dimer was elevated so CT pulmonary angiogram was ordered that did not show 

pneumonia or PE.  IV fluids and anti-emetics were given.  Upon reevaluation, 

the patient felt much better.  His tachycardia and fever had improved.  I 

suspect his symptoms are secondary to an acute gastroenteritis and less likely 

a serious bacterial infection.  Given his complaints of waking up at night 

short of breath, I advised the patient to see his primary care doctor regarding 

this as he may have obstructive sleep apnea.  Patient is agreeable with this 

plan.  He plans to follow-up with his PMD.  Return precautions were discussed.  

Patient was discharged in a stable condition.





Departure


Diagnosis:  


 Primary Impression:  


 Palpitations


 Additional Impressions:  


 Vomiting and diarrhea


 Febrile illness, acute


Condition:  Stable


Patient Instructions:  Self-Care for Vomiting and Diarrhea, Palpitations


Referrals:  


[facility]


YOU HAVE RECEIVED A MEDICAL SCREENING EXAM AND THE RESULTS INDICATE THAT YOU DO 

NOT HAVE A CONDITION THAT REQUIRES URGENT TREATMENT IN THE EMERGENCY DEPARTMENT.





FURTHER EVALUATION AND TREATMENT OF YOUR CONDITION CAN WAIT UNTIL YOU ARE SEEN 

IN YOUR DOCTORS OFFICE WITHIN THE NEXT 1-2 DAYS. IT IS YOUR RESPONSIBILITY TO 

MAKE AN APPOINTMENT FOR FOL-UP CARE.





IF YOU HAVE A PRIMARY DOCTOR


--you should call your primary doctor and schedule an appointment





IF YOU DO NOT HAVE A PRIMARY DOCTOR YOU CAN CALL OUR PHYSICIAN REFERRAL HOTLINE 

AT


 [phone] 





IF YOU CAN NOT AFFORD TO SEE A PHYSICIAN YOU CAN CHOSE FROM THE FOLLOWING 

[facility] CLINICS





[facility] [phone] [address] [facility]. [facility] [phone] [address] [facility]. [facility] [phone] [address] [facility]. [facility] [phone] [address] [facility]. [facility] [phone] [address] [facility]. [facility] [phone] [address]





Additional Instructions:  


See your primary care doctor within the next 1-2 days.  Return to the ER for 

any worsening symptoms.











SHEREE MOURA MD Jul 6, 2017 04:35

## 2018-10-28 ENCOUNTER — HOSPITAL ENCOUNTER (EMERGENCY)
Dept: HOSPITAL 91 - FTE | Age: 33
Discharge: HOME | End: 2018-10-28
Payer: SELF-PAY

## 2018-10-28 ENCOUNTER — HOSPITAL ENCOUNTER (EMERGENCY)
Age: 33
Discharge: HOME | End: 2018-10-28

## 2018-10-28 DIAGNOSIS — Z87.891: ICD-10-CM

## 2018-10-28 DIAGNOSIS — M25.562: Primary | ICD-10-CM

## 2018-10-28 PROCEDURE — 73562 X-RAY EXAM OF KNEE 3: CPT

## 2018-10-28 PROCEDURE — 29505 APPLICATION LONG LEG SPLINT: CPT

## 2018-10-28 PROCEDURE — 99283 EMERGENCY DEPT VISIT LOW MDM: CPT

## 2018-10-28 RX ADMIN — IBUPROFEN 1 MG: 800 TABLET, FILM COATED ORAL at 20:59
